# Patient Record
Sex: MALE | Race: BLACK OR AFRICAN AMERICAN | Employment: OTHER | URBAN - NONMETROPOLITAN AREA
[De-identification: names, ages, dates, MRNs, and addresses within clinical notes are randomized per-mention and may not be internally consistent; named-entity substitution may affect disease eponyms.]

---

## 2018-01-01 ENCOUNTER — HOSPITAL ENCOUNTER (OUTPATIENT)
Dept: PHYSICAL THERAPY | Age: 48
Setting detail: THERAPIES SERIES
Discharge: HOME | End: 2018-12-19
Payer: MEDICARE

## 2018-01-01 ENCOUNTER — HOSPITAL ENCOUNTER (OUTPATIENT)
Dept: PHYSICAL THERAPY | Age: 48
Setting detail: THERAPIES SERIES
Discharge: HOME | End: 2018-12-06
Payer: MEDICARE

## 2018-01-01 ENCOUNTER — HOSPITAL ENCOUNTER (OUTPATIENT)
Dept: PHYSICAL THERAPY | Age: 48
Setting detail: THERAPIES SERIES
End: 2018-01-01
Payer: MEDICARE

## 2018-01-01 ENCOUNTER — HOSPITAL ENCOUNTER (OUTPATIENT)
Dept: PHYSICAL THERAPY | Age: 48
Setting detail: THERAPIES SERIES
Discharge: HOME | End: 2018-11-29
Payer: MEDICARE

## 2018-01-01 ENCOUNTER — HOSPITAL ENCOUNTER (OUTPATIENT)
Dept: PHYSICAL THERAPY | Age: 48
Setting detail: THERAPIES SERIES
Discharge: HOME | End: 2018-11-19
Payer: MEDICARE

## 2018-01-01 ENCOUNTER — HOSPITAL ENCOUNTER (OUTPATIENT)
Dept: PHYSICAL THERAPY | Age: 48
Setting detail: THERAPIES SERIES
Discharge: HOME | End: 2018-12-10
Payer: MEDICARE

## 2018-01-01 ENCOUNTER — HOSPITAL ENCOUNTER (OUTPATIENT)
Dept: PHYSICAL THERAPY | Age: 48
Setting detail: THERAPIES SERIES
Discharge: HOME | End: 2018-12-13
Payer: MEDICARE

## 2018-01-01 ENCOUNTER — HOSPITAL ENCOUNTER (OUTPATIENT)
Dept: PHYSICAL THERAPY | Age: 48
Setting detail: THERAPIES SERIES
Discharge: HOME | End: 2018-12-17
Payer: MEDICARE

## 2018-01-01 PROCEDURE — 97110 THERAPEUTIC EXERCISES: CPT

## 2018-01-01 PROCEDURE — G8979 MOBILITY GOAL STATUS: HCPCS

## 2018-01-01 PROCEDURE — G8978 MOBILITY CURRENT STATUS: HCPCS

## 2018-01-01 PROCEDURE — 97162 PT EVAL MOD COMPLEX 30 MIN: CPT

## 2018-01-01 ASSESSMENT — PAIN DESCRIPTION - LOCATION
LOCATION: LEG;KNEE
LOCATION: LEG

## 2018-01-01 ASSESSMENT — PAIN SCALES - GENERAL
PAINLEVEL_OUTOF10: 9
PAINLEVEL_OUTOF10: 9

## 2018-01-01 ASSESSMENT — PAIN DESCRIPTION - ORIENTATION
ORIENTATION: RIGHT;LEFT
ORIENTATION: RIGHT

## 2018-11-19 NOTE — PROGRESS NOTES
printed: seated heel/toe raise, supine SAQ, SLR, prone hamstring curl  Exercise 2: Next session: NuStep  Exercise 3: Next session: 3-way hip  Exercise 4: Next session: Hydrohip  Exercise 5: Next session: NK table                          Activity Tolerance:  Activity Tolerance: Patient Tolerated treatment well    Assessment: Body structures, Functions, Activity limitations: Decreased functional mobility , Decreased ADL status, Decreased ROM, Decreased strength, Decreased endurance, Decreased sensation, Decreased balance  Prognosis: Good  REQUIRES PT FOLLOW UP: Yes  Discharge Recommendations: Continue to assess pending progress    Patient Education:  Patient Education: Patient educated on POC and HEP                   Plan:  Times per week: 2-3 times per week  Plan weeks: Up to 12 weeks  Specific instructions for Next Treatment: LE and core stretches and strengthening, ambulation, balance  Current Treatment Recommendations: Strengthening, Functional Mobility Training, Home Exercise Program, Modalities, Aquatics, Gait Training, Transfer Training, ROM, Safety Education & Training, Balance Training, ADL/Self-care Training, Endurance Training, Stair training, Pain Management  Plan Comment: POC initiated    History: Personal factors or comorbidities that impact plan of care -  Moderate Complexity: 1-2 personal factors or comorbidities. See history section above for details. Examination: Body structures and functions, activity limitations, participation restrictions; using standardized tests and measures - Moderate Complexity: 3 or morebody structures and functional, activity limitations and/or participation restrictions. See restrictions and objective section above for details. Clinical Presentation: Moderate - Evolving with Changing Characteristics: Riding a scooter, has had falls at home, decreased ankle ROM    Decision Making: Moderate Complexity due to Patient able to participate in session.   Patient is not

## 2018-12-05 NOTE — PROGRESS NOTES
[facility]  PHYSICAL THERAPY MISSED TREATMENT NOTE  OUTPATIENT REHABILITATION    Date: 2018  Patient Name: Elizabeth Staley        MRN: [id number]   : 1970  (53 y.o.)  Gender: male           PT Visit Information  Canceled Appointment: 1    REASON FOR MISSED TREATMENT:  Patient called and reported hot water heater went out. Patient confirmed he will be at appointment tomorrow. Nery Patrick  [address] [facility], [address] [facility]

## 2018-12-17 NOTE — PROGRESS NOTES
progress    Patient Education:  Patient Education: Continue with HEP                      Plan:  Times per week: 2-3 times per week  Plan weeks: Up to 12 weeks  Specific instructions for Next Treatment: LE and core stretches and strengthening, ambulation, balance  Plan Comment: Continue per POC    Goals:  Patient goals : \"Get stronger and walk better. \"    Short term goals  Time Frame for Short term goals: 4 weeks  Short term goal 1: Patient to receive bilateral AFOs and use for walking to allow patient to clear feet to assist with preventing falls. Short term goal 2: Increase bilateral hip and knee strength to 4/5 to assist with stabilty walking up to bedroom. Short term goal 3: Patient able to complete transfers with mod I on varying surfaces to assist with getting out of car. Short term goal 4: Patient to report pain in legs no more than 8/10 to assist with ambulation. Long term goals  Time Frame for Long term goals : Up to 12 weeks  Long term goal 1: Independent with HEP and with progression to assist with increasing strength.          Jeffery Guillen

## 2019-01-01 ENCOUNTER — HOSPITAL ENCOUNTER (OUTPATIENT)
Dept: PHYSICAL THERAPY | Age: 49
Setting detail: THERAPIES SERIES
Discharge: HOME | End: 2019-01-07
Payer: MEDICARE

## 2019-01-01 ENCOUNTER — APPOINTMENT (OUTPATIENT)
Dept: PHYSICAL THERAPY | Age: 49
End: 2019-01-01
Payer: MEDICARE

## 2019-01-01 ENCOUNTER — HOSPITAL ENCOUNTER (OUTPATIENT)
Dept: PHYSICAL THERAPY | Age: 49
Setting detail: THERAPIES SERIES
Discharge: HOME | End: 2019-03-04
Payer: MEDICARE

## 2019-01-01 ENCOUNTER — HOSPITAL ENCOUNTER (OUTPATIENT)
Dept: PHYSICAL THERAPY | Age: 49
Setting detail: THERAPIES SERIES
End: 2019-01-01
Payer: MEDICARE

## 2019-01-01 ENCOUNTER — HOSPITAL ENCOUNTER (OUTPATIENT)
Dept: PHYSICAL THERAPY | Age: 49
Setting detail: THERAPIES SERIES
Discharge: HOME | End: 2019-01-10
Payer: MEDICARE

## 2019-01-01 ENCOUNTER — HOSPITAL ENCOUNTER (OUTPATIENT)
Dept: PHYSICAL THERAPY | Age: 49
Setting detail: THERAPIES SERIES
Discharge: HOME | End: 2019-03-01
Payer: MEDICARE

## 2019-01-01 ENCOUNTER — HOSPITAL ENCOUNTER (OUTPATIENT)
Dept: PHYSICAL THERAPY | Age: 49
Setting detail: THERAPIES SERIES
Discharge: HOME | End: 2019-02-18
Payer: MEDICARE

## 2019-01-01 ENCOUNTER — HOSPITAL ENCOUNTER (EMERGENCY)
Age: 49
End: 2019-06-24
Attending: EMERGENCY MEDICINE
Payer: MEDICARE

## 2019-01-01 ENCOUNTER — HOSPITAL ENCOUNTER (OUTPATIENT)
Dept: PHYSICAL THERAPY | Age: 49
Setting detail: THERAPIES SERIES
Discharge: HOME | End: 2019-01-21
Payer: MEDICARE

## 2019-01-01 ENCOUNTER — HOSPITAL ENCOUNTER (OUTPATIENT)
Dept: PHYSICAL THERAPY | Age: 49
Setting detail: THERAPIES SERIES
Discharge: HOME | End: 2019-02-25
Payer: MEDICARE

## 2019-01-01 ENCOUNTER — HOSPITAL ENCOUNTER (OUTPATIENT)
Dept: PHYSICAL THERAPY | Age: 49
Setting detail: THERAPIES SERIES
Discharge: HOME | End: 2019-03-11
Payer: MEDICARE

## 2019-01-01 ENCOUNTER — HOSPITAL ENCOUNTER (OUTPATIENT)
Dept: PHYSICAL THERAPY | Age: 49
Setting detail: THERAPIES SERIES
Discharge: HOME | End: 2019-01-14
Payer: MEDICARE

## 2019-01-01 ENCOUNTER — HOSPITAL ENCOUNTER (OUTPATIENT)
Dept: PHYSICAL THERAPY | Age: 49
Setting detail: THERAPIES SERIES
Discharge: HOME | End: 2019-01-03
Payer: MEDICARE

## 2019-01-01 ENCOUNTER — HOSPITAL ENCOUNTER (OUTPATIENT)
Dept: PHYSICAL THERAPY | Age: 49
Setting detail: THERAPIES SERIES
Discharge: HOME | End: 2019-03-07
Payer: MEDICARE

## 2019-01-01 DIAGNOSIS — I46.9 CARDIOPULMONARY ARREST (HCC): Primary | ICD-10-CM

## 2019-01-01 LAB — GLUCOSE BLD-MCNC: 372 MG/DL (ref 70–108)

## 2019-01-01 PROCEDURE — 97116 GAIT TRAINING THERAPY: CPT

## 2019-01-01 PROCEDURE — 97110 THERAPEUTIC EXERCISES: CPT

## 2019-01-01 PROCEDURE — 92950 HEART/LUNG RESUSCITATION CPR: CPT

## 2019-01-01 PROCEDURE — 2580000003 HC RX 258

## 2019-01-01 PROCEDURE — 2500000003 HC RX 250 WO HCPCS

## 2019-01-01 PROCEDURE — 6360000002 HC RX W HCPCS

## 2019-01-01 PROCEDURE — 82948 REAGENT STRIP/BLOOD GLUCOSE: CPT

## 2019-01-01 PROCEDURE — 6360000002 HC RX W HCPCS: Performed by: EMERGENCY MEDICINE

## 2019-01-01 PROCEDURE — 99291 CRITICAL CARE FIRST HOUR: CPT

## 2019-01-01 PROCEDURE — 2500000003 HC RX 250 WO HCPCS: Performed by: EMERGENCY MEDICINE

## 2019-01-01 RX ORDER — ATROPINE SULFATE 0.1 MG/ML
INJECTION INTRAVENOUS DAILY PRN
Status: DISCONTINUED | OUTPATIENT
Start: 2019-01-01 | End: 2019-06-24 | Stop reason: HOSPADM

## 2019-01-01 RX ADMIN — Medication 50 MEQ: at 23:20

## 2019-01-01 RX ADMIN — EPINEPHRINE 1 MG: 0.1 INJECTION, SOLUTION ENDOTRACHEAL; INTRACARDIAC; INTRAVENOUS at 23:23

## 2019-01-01 RX ADMIN — EPINEPHRINE 1 MG: 0.1 INJECTION, SOLUTION ENDOTRACHEAL; INTRACARDIAC; INTRAVENOUS at 22:58

## 2019-01-01 RX ADMIN — EPINEPHRINE 1 MG: 0.1 INJECTION, SOLUTION ENDOTRACHEAL; INTRACARDIAC; INTRAVENOUS at 23:20

## 2019-01-01 RX ADMIN — EPINEPHRINE 1 MG: 0.1 INJECTION, SOLUTION ENDOTRACHEAL; INTRACARDIAC; INTRAVENOUS at 23:05

## 2019-01-01 RX ADMIN — EPINEPHRINE 1 MG: 0.1 INJECTION, SOLUTION ENDOTRACHEAL; INTRACARDIAC; INTRAVENOUS at 23:14

## 2019-01-01 RX ADMIN — EPINEPHRINE 1 MG: 0.1 INJECTION, SOLUTION ENDOTRACHEAL; INTRACARDIAC; INTRAVENOUS at 23:02

## 2019-01-01 RX ADMIN — ATROPINE SULFATE 0.5 MG: 0.1 INJECTION, SOLUTION ENDOTRACHEAL; INTRAMUSCULAR; INTRAVENOUS; SUBCUTANEOUS at 23:13

## 2019-01-01 RX ADMIN — EPINEPHRINE 1 MG: 0.1 INJECTION, SOLUTION ENDOTRACHEAL; INTRACARDIAC; INTRAVENOUS at 23:17

## 2019-01-01 RX ADMIN — EPINEPHRINE 1 MG: 0.1 INJECTION, SOLUTION ENDOTRACHEAL; INTRACARDIAC; INTRAVENOUS at 23:08

## 2019-01-01 RX ADMIN — Medication 50 MEQ: at 22:59

## 2019-01-01 RX ADMIN — EPINEPHRINE 1 MG: 0.1 INJECTION, SOLUTION ENDOTRACHEAL; INTRACARDIAC; INTRAVENOUS at 23:26

## 2019-01-01 RX ADMIN — EPINEPHRINE 1 MG: 0.1 INJECTION, SOLUTION ENDOTRACHEAL; INTRACARDIAC; INTRAVENOUS at 23:11

## 2019-06-24 VITALS — HEART RATE: 23 BPM

## 2019-06-24 PROCEDURE — 2709999900 HC NON-CHARGEABLE SUPPLY

## 2019-06-24 NOTE — ED PROVIDER NOTES
[facility]  eMERGENCY dEPARTMENT eNCOUnter          CHIEF COMPLAINT       Chief Complaint   Patient presents with    Cardiac Arrest       Nurses Notes reviewed and I agreeexcept as noted in the HPI. HISTORY OF PRESENT ILLNESS    Andrey Santamaria is a 51 y.o. male with a past medical history of DM. Patient arrived via EMS for cardiac arrest. Upon arrival, patient was A&O x4 and was talking. They state he got up to go to the bathroom when he fell and then went unresponsive but had a pulse. Once taken out to the truck, patient had no pulse so they started CPR. Two rounds of 1mg epi was given en route by EMS. Patient was then in v-fib and given 1 shock and given 300mg amiodarone by EMS. No change. Patient was given 150mg amiodarone. Patient was then in PEA. Patient arrived with padmini airway and IO placed in left tibia. EMS states patient was pulseless for 15 minutes. Per jaylen, they were sitting on the couch watching TV and eating ice cream when patient stood and then became lightheaded. Patient then sat back down and drank Gatorade. Patient then got up to go to the bathroom, started complaining of chest pain and shortness of breath and then fell. Jaylen called 911 at that point. She states patient was responsive until EMS arrived. She denies any other history other than DM. She was on the phone with the patient's brother who states there is no family history of sudden cardiac arrests. The HPI was provided by EMS and the patient's family. REVIEW OF SYSTEMS      Review of Systems   Unable to perform ROS: Patient unresponsive         PAST MEDICAL HISTORY    has a past medical history of Diabetes mellitus ([facility] [address] SURGICAL HISTORY      has no past surgical history on file. CURRENT MEDICATIONS       There are no discharge medications for this patient. ALLERGIES     has no allergies on file. FAMILY HISTORY     has no family status information on file.     family history is not on file.    SOCIAL HISTORY        PHYSICAL EXAM     INITIAL VITALS:  pulse is 23 (abnormal). Physical Exam   Constitutional: He is intubated. Riaz airway in place. Eyes: Right pupil is not round and not reactive. Left pupil is not round and not reactive. Pupils fixed and dilated. Cardiovascular:   Patient in PEA. No manual pulse. Pulmonary/Chest: He is intubated. Neurological: He is unresponsive. IO in left leg. DIFFERENTIAL DIAGNOSIS:   Differential diagnoses were discussedextensively with the patient and family including but no limited to: cardiac arrest     DIAGNOSTIC RESULTS     EKG: All EKG's are interpreted by the Emergency Department Physician who either signs or Co-signs this chart in the absence of a cardiologist.  EKG interpreted by Dontae Manning, DO:    None    RADIOLOGY: non-plain film images(s) such as CT, Ultrasound and MRI are read by the radiologist.    No orders to display       LABS:   Labs Reviewed   POCT GLUCOSE - Abnormal; Notable for the following components:       Result Value    POC Glucose 372 (*)     All other components within normal limits       EMERGENCY DEPARTMENT COURSE:   Vitals:    Vitals:    06/23/19 2313 06/23/19 2314   Pulse: 52 (!) 23       10:56 PM: Patient arrived via EMS to room 2. Patient intubated and CPR in progress. Two rounds of 1mg epi was given en route by EMS. Patient was then in v-fib and given 1 shock and given 300mg amiodarone by EMS. No change. Patient was given 150mg amiodarone. Patient was then in PEA. 10:58 PM: Epi and Bicarb given. CPR in progress. Bedside cardiac US done by Dr. Shorty Parrish. No cardiac rhythm. Patient in PEA. 11:01 PM: CPR stopped and pulse check was done. Patient in PEA. CPR resumed. 11:02 PM: Epi given. CPR in progress. 11:05 PM: Epi given. CPR in progress. 11:06 PM: CPR stopped. Pulse check. Patient in PEA. CPR resumed. 11:07 PM: Blood sugar was 372.    11:08 PM: Epi given. CPR stopped. Pulse check.  Patient in PEA. CPR resumed. 11:11 PM: Epi given. CPR stopped. Pulse check. Patient in PEA. CPR resumed. 11:12 PM: Bedside cardiac US done. Patient in PEA. 11:13 PM: 0.5 Atropine and epi given. CPR stopped. Pulse check. Patient in PEA. CPR resumed. 11:16 PM: CPR stopped. Pulse check. Patient in PEA. CPR resumed. 11:17 PM: Epi given. CPR in progress. 11:19 PM: CPR stopped. Pulse check. Patient in PEA. CPR resumed. 11:20 PM: Epi and Sodium bicarbonate given. CPR in progress. 11:21 PM: Patient's fiance at bedside. 11:23 PM: Epi given. CPR stopped. Pulse check. Bedside cardiac US showed PEA. CPR resumed. 11:26 PM: Epi given. CPR in progress. 11:27 PM: CPR stopped. Pulse check. Patient in PEA. CPR resumed. 11:29 PM: CPR stopped. Bedside cardiac US showed PEA. No manual pulse felt. Time of death was called by me. In my estimation the patient had a MI resulting in sudden cardiac death. 12:09 AM: I left a message for Dr. Lisset Vega,    12:10 AM: Dr. Lisset Vega called me back. He had no other heart history and was just being treated for DM. He agrees with me about patient getting an autopsy performed. CRITICALCARE:   There was a high probability of clinically significant/life threatening deterioration in this patient's condition which required my urgent intervention. Total critical care time was 40 minutes. This excludes any time for separately reportable procedures. CONSULTS:  Dr. Lisset Vega, Family Medicine    PROCEDURES:  None    FINAL IMPRESSION      1. Cardiopulmonary arrest [facility])          DISPOSITION/PLAN       PATIENT REFERRED TO:  No follow-up provider specified. DISCHARGE MEDICATIONS:  There are no discharge medications for this patient.       (Please note that portions of this note were completed with a voice recognition program.  Efforts weremade to edit the dictations but occasionally words are mis-transcribed.)    Scribe:  Leigh Ann Pizarro 19 11:32 PM Scribing for and

## 2019-06-24 NOTE — CODE DOCUMENTATION
Bedside US completed. No cardiac activity noted. Pulse check. No pulse noted. Pt asystole. Dr. Caron Umanzor called time of death.

## 2019-06-24 NOTE — ED NOTES
Bed: 002A  Expected date: 6/23/19  Expected time:   Means of arrival: [facility] Dept  Comments:      Stephnaia Levine RN  06/23/19 2756

## 2019-06-24 NOTE — CODE DOCUMENTATION
0.9NS 1 liter infusing at this time through IV. 0.9NS 2nd liter fluids infusing through IO on pressure bag.

## 2019-06-24 NOTE — CODE DOCUMENTATION
Pt to er via EMS. EMS states they were called for pt having CP, dizziness and high BS. Upon arrival to scene, EMS states pt was a & o x 3. Girlfriend stated to EMS, pt was having CP prior to EMS arrival as he was going up the stairs and could not go up them because of the pain. As EMS was on scene, pt stated needed to use the restroom and went up the stairs and collapsed. EMS states pt unresponsive but had a pulse. Pt was taken out to EMS truck. No pulse was present and CPR was started by EMS. Pt was initially asystole in route. Two rounds of 1mg epi was given in route by EMS. Pt was then in v-fib and given 1 shock and given 300mg amiodarone by EMS. No change. Pt was given 150mg amiodarone. Pt was then in PEA. Upon arrival to er, CPR in process by [employer]. EMS states pt was pulseless for about 15 minutes PTA. Pt arrived with padmini airway and IO left tib in place. EMS states  on scene. Dr. Ligia Kc and resp at bedside.

## 2019-06-24 NOTE — ED NOTES
Patient transported to [facility] and placed in [address] [facility]  06/24/19 8078

## 2019-06-24 NOTE — CODE DOCUMENTATION
Bedside US completed by Dr. Bradford Arellano. No cardiac activity noted per Dr. Bradford Arellano. Pulse checked. No pulse detected. PEA . CPR providers switched and compression resumed.

## 2019-06-24 NOTE — FLOWSHEET NOTE
06/24/19 0045   Encounter Summary   Services provided to: Family   Referral/Consult From: Nurse   Support System Significant other;Family members   Continue Visiting No  (6/24)   Complexity of Encounter High   Length of Encounter 15 minutes   Grief and Life Adjustment   Type Death   Assessment Tearful;Grieving;Despair   Intervention Scripture;Prayer; Active listening;Grief care   Outcome Expressed gratitude        06/24/19 0045   Encounter Summary   Services provided to: Family   Referral/Consult From: Nurse   Support System Significant other;Family members   Continue Visiting No  (6/24)   Complexity of Encounter High   Length of Encounter 15 minutes   Grief and Life Adjustment   Type Death   Assessment Tearful;Grieving;Despair   Intervention Scripture;Prayer; Active listening;Grief care   Outcome Expressed gratitude

## 2019-06-24 NOTE — PROGRESS NOTES
Respiratory care participated in Code. End tidal CO2 monitor and Resqpod applied to patient. For further information see Code sheet.

## 2019-06-24 NOTE — FLOWSHEET NOTE
Patient was brought into the ED with chest discomfort, coded and then passed. The patient's significant other was with the patient at the time. Other family members are being called. This  provided the ministry of presence and spiritual care. Scripture from [address] was read and a prayer offered on behalf of the patient and the family. 06/24/19 0001   Encounter Summary   Services provided to: Family   Referral/Consult From: Nursing Supervisor/Manager   Support System Significant other;Family members; Children   Continue Visiting No  (6/24)   Complexity of Encounter High   Length of Encounter 1 hour   Crisis   Type Code   Assessment Tearful;Grieving; Shock   Intervention Sustaining presence/ Ministry of presence; Active listening;Prayer;Grief care   Outcome Expressed gratitude

## 2019-06-24 NOTE — CODE DOCUMENTATION
Pulse check completed. Pulse palpated by Dr. Little Washington. Bedside US completed by Dr. Little Washington and cardiac activity noted.